# Patient Record
Sex: MALE | Race: OTHER
[De-identification: names, ages, dates, MRNs, and addresses within clinical notes are randomized per-mention and may not be internally consistent; named-entity substitution may affect disease eponyms.]

---

## 2017-07-13 ENCOUNTER — HOSPITAL ENCOUNTER (OUTPATIENT)
Dept: HOSPITAL 53 - M LRY | Age: 52
End: 2017-07-13
Attending: PHYSICIAN ASSISTANT
Payer: COMMERCIAL

## 2017-07-13 DIAGNOSIS — M25.571: Primary | ICD-10-CM

## 2017-07-13 NOTE — REP
RIGHT ANKLE SERIES:

 

Four views of the right ankle performed.

 

There is moderate soft tissue swelling laterally. No acute fracture or

dislocation is seen. There appear to be old avulsion fractures of the medial

malleolus. The ankle mortise is anatomic. There is inferior calcaneal spurring.

 

IMPRESSION:

 

Soft tissue swelling. Old avulsion fractures medial malleolus. No evidence for

acute fracture or dislocation.

 

 

Signed by

Jevon Griffin MD 07/13/2017 07:30 P

## 2017-07-13 NOTE — REP
RIGHT FOOT SERIES:

 

Four views of the right foot are performed.

 

No acute fracture or dislocation is seen. There is mild to moderate inferior

calcaneal spurring. There is mild narrowing of the first metatarsophalangeal

joint.

 

IMPRESSION:

 

No acute fracture or dislocation.

 

 

Signed by

Jevon Griffin MD 07/13/2017 07:30 P

## 2021-01-02 ENCOUNTER — HOSPITAL ENCOUNTER (OUTPATIENT)
Dept: HOSPITAL 53 - M LABSMTC | Age: 56
End: 2021-01-02
Attending: ANESTHESIOLOGY
Payer: COMMERCIAL

## 2021-01-02 DIAGNOSIS — Z01.812: Primary | ICD-10-CM

## 2021-01-02 DIAGNOSIS — Z20.828: ICD-10-CM

## 2021-01-07 ENCOUNTER — HOSPITAL ENCOUNTER (OUTPATIENT)
Dept: HOSPITAL 53 - M OPP | Age: 56
Discharge: HOME | End: 2021-01-07
Attending: INTERNAL MEDICINE
Payer: COMMERCIAL

## 2021-01-07 VITALS — HEIGHT: 70 IN | BODY MASS INDEX: 36.94 KG/M2 | WEIGHT: 258 LBS

## 2021-01-07 VITALS — DIASTOLIC BLOOD PRESSURE: 97 MMHG | SYSTOLIC BLOOD PRESSURE: 137 MMHG

## 2021-01-07 DIAGNOSIS — Z80.0: ICD-10-CM

## 2021-01-07 DIAGNOSIS — Z79.899: ICD-10-CM

## 2021-01-07 DIAGNOSIS — I10: ICD-10-CM

## 2021-01-07 DIAGNOSIS — K64.8: ICD-10-CM

## 2021-01-07 DIAGNOSIS — D12.5: ICD-10-CM

## 2021-01-07 DIAGNOSIS — E78.5: ICD-10-CM

## 2021-01-07 DIAGNOSIS — Z12.11: Primary | ICD-10-CM

## 2021-01-07 NOTE — ROOR
________________________________________________________________________________

Patient Name: Dieudonne Rodrigues             Procedure Date: 1/7/2021 10:23 AM

MRN: [id number]                          Account Number: [id number]

YOB: 1965               Age: 55

Room: [facility]                            Gender: Male

Note Status: Finalized                 

________________________________________________________________________________

 

Procedure:            Colonoscopy

Indications:          Screening in patient at increased risk: Family history 

                      of 1st-degree relative with colorectal cancer, Colon 

                      cancer screening in patient at increased risk: Family 

                      history of colorectal cancer in multiple 2nd degree 

                      relatives

Providers:            Tam ALDRIDGE MD

Referring MD:         Esau Matos MD

Requesting Provider:  

Medicines:            Monitored Anesthesia Care

Complications:        No immediate complications.

________________________________________________________________________________

Procedure:            Pre-Anesthesia Assessment:

                      - The heart rate, respiratory rate, oxygen saturations, 

                      blood pressure, adequacy of pulmonary ventilation, and 

                      response to care were monitored throughout the procedure.

                      The Colonoscope was introduced through the anus and 

                      advanced to the terminal ileum, with identification of 

                      the appendiceal orifice and IC valve. The colonoscopy 

                      was performed without difficulty. The patient tolerated 

                      the procedure well. The quality of the bowel preparation 

                      was fair.

                                                                                

Findings:

     The perianal and digital rectal examinations were normal.

     Two sessile polyps were found in the sigmoid colon. The polyps were 

     diminutive in size. These polyps were removed with a cold snare. 

     Resection and retrieval were complete.

     Small Internal Hemorrhoids.

     The exam was otherwise without abnormality on direct and retroflexion 

     views.

                                                                                

Impression:           - Preparation of the colon was fair.

                      - Two diminutive polyps in the sigmoid colon, removed 

                      with a cold snare. Resected and retrieved.

                      - Small Internal Hemorrhoids.

                      - The examination was otherwise normal on direct and 

                      retroflexion views.

Recommendation:       - Repeat colonoscopy in 5 years for surveillance.

                                                                                

Procedure Code(s):    --- Professional ---

                      74760, Colonoscopy, flexible; with removal of tumor(s), 

                      polyp(s), or other lesion(s) by snare technique

Diagnosis Code(s):    --- Professional ---

                      Z80.0, Family history of malignant neoplasm of digestive 

                      organs

                      K63.5, Polyp of colon

 

CPT copyright 2019 [employer]. All rights reserved.

 

The codes documented in this report are preliminary and upon  review may 

be revised to meet current compliance requirements.

 

Tam Aldridge MD

________________

Tam ALDRIDGE MD

1/7/2021 10:56:35 AM

Electronically signed by Tam ALDRIDGE MD

Number of Addenda: 0

 

Note Initiated On: 1/7/2021 10:23 AM

Estimated Blood Loss: Estimated blood loss: none.

## 2024-12-30 ENCOUNTER — HOSPITAL ENCOUNTER (OUTPATIENT)
Dept: HOSPITAL 53 - M LAB REF | Age: 59
End: 2024-12-30

## 2024-12-30 DIAGNOSIS — Z00.00: Primary | ICD-10-CM
